# Patient Record
(demographics unavailable — no encounter records)

---

## 2017-06-10 NOTE — UC
Knee Pain HPI





- HPI Summary


HPI Summary: 





6/5 jumped off of "7-foot drop" and states she landed on her feet and then fell 

forward onto her knees. Had some R knee pain after that but was bearing weight 

normally. Yesterday playing basketball in gym class tripped and possibly 

twisted knee, having more pain in R knee especially with full extension.





- History of Current Complaint


Chief Complaint: UCLowerExtremity


Stated Complaint: KNEE INJURY


Time Seen by Provider: 06/10/17 12:27


Hx Obtained From: Patient


Hx Last Menstrual Period: 5/15/17


Pregnant?: No


Onset/Duration: Sudden Onset


Severity Initially: Mild


Severity Currently: Moderate


Character: Dull, Aching, Stiffness


Aggravating Factor(s): Weight Bearing, Prolonged Standing


Alleviating Factor(s): Rest


Associated Signs And Symptoms: Negative: Swelling, Redness, Bruising, Numbness, 

Tingling


Able to Bear Weight: Yes





- Allergies/Home Medications


Allergies/Adverse Reactions: 


 Allergies











Allergy/AdvReac Type Severity Reaction Status Date / Time


 


Bee Venom Allergy  Hives/Diff. Verified 06/10/17 10:51





   Breathing/I  





   tching  











Home Medications: 


 Home Medications





NK [No Home Medications Reported]  06/10/17 [History Confirmed 06/10/17]











PMH/Surg Hx/FS Hx/Imm Hx


Previously Healthy: Yes


Other History Of: 


   Negative For: HIV, Hepatitis B, Hepatitis C, Anticoagulant Therapy





- Surgical History


Surgical History: None


Surgery Procedure, Year, and Place: denies





- Family History


Known Family History: 


   Negative: Cardiac Disease, Hypertension, Diabetes





- Social History


Occupation: Student


Lives: With Family


Alcohol Use: None


Substance Use Type: None


Smoking Status (MU): Never Smoked Tobacco





- Immunization History


Vaccination Up to Date: Yes





Review of Systems


Constitutional: Negative


Skin: Negative


Eyes: Negative


ENT: Negative


Respiratory: Negative


Cardiovascular: Negative


Gastrointestinal: Negative


Genitourinary: Negative


Motor: Negative


Neurovascular: Negative


Musculoskeletal: Arthralgia, Decreased ROM


Neurological: Negative


Psychological: Negative


All Other Systems Reviewed And Are Negative: Yes





Physical Exam


Triage Information Reviewed: Yes


Appearance: Well-Appearing, No Pain Distress, Well-Nourished


Vital Signs: 


 Initial Vital Signs











Temp  99.6 F   06/10/17 10:48


 


Pulse  66   06/10/17 10:48


 


Resp  16   06/10/17 10:48


 


BP  119/73   06/10/17 10:48


 


Pulse Ox  100   06/10/17 10:48











Vital Signs Reviewed: Yes


Eye Exam: Normal


Eyes: Positive: Conjunctiva Clear


ENT Exam: Normal


ENT: Positive: Normal ENT inspection, Hearing grossly normal, Pharynx normal, 

TMs normal


Dental Exam: Normal


Neck exam: Normal


Neck: Positive: Supple, Nontender, No Lymphadenopathy


Respiratory Exam: Normal


Respiratory: Positive: Chest non-tender, Lungs clear, Normal breath sounds, No 

respiratory distress, No accessory muscle use


Cardiovascular Exam: Normal


Cardiovascular: Positive: RRR, No Murmur


Musculoskeletal Exam: Other - no bony tenderness R knee


Musculoskeletal: Positive: No Edema, ROM Limited @ - R knee


Neurological Exam: Normal


Neurological: Positive: Alert


Psychological Exam: Normal


Skin Exam: Normal





Knee Pain Course/Dx





- Differential Dx/Diagnosis


Provider Diagnoses: R knee sprain





Discharge





- Discharge Plan


Condition: Stable


Disposition: HOME


Patient Education Materials:  Knee Sprain (ED)


Referrals: 


Ken Antonio MD [Primary Care Provider] - 


Additional Instructions: 


Avoid contact sports and vigorous activities until your knee pain is improved (

usually 1-2 weeks). You can take 400mg ibuprofen 4 times per day as needed for 

pain; if you see swelling, elevate the knee for 1-2 hours. 





If you are not back to your normal activities, including running and climbing 

within 2 weeks or so, please see your primary care provider for a recheck. 

Please be seen right away if you have weakness or instability of the knee.

## 2017-06-10 NOTE — RAD
HISTORY: Subacute trauma to the right knee



COMPARISONS: None



VIEWS: 4, Frontal, lateral, axial, and oblique views of the right knee



FINDINGS:



BONE DENSITY: Normal.

BONES: There is no displaced fracture.    

JOINTS: There is no arthropathy. There is no suprapatellar joint effusion or

lipohemarthrosis.

ALIGNMENT: There is no dislocation. 

SOFT TISSUES: Unremarkable.



OTHER FINDINGS: None.



IMPRESSION: 

NO ACUTE OSSEOUS INJURY. IF SYMPTOMS PERSIST, RECOMMEND REPEAT IMAGING.